# Patient Record
Sex: FEMALE | Race: OTHER | NOT HISPANIC OR LATINO | Employment: FULL TIME | URBAN - METROPOLITAN AREA
[De-identification: names, ages, dates, MRNs, and addresses within clinical notes are randomized per-mention and may not be internally consistent; named-entity substitution may affect disease eponyms.]

---

## 2021-03-21 ENCOUNTER — HOSPITAL ENCOUNTER (EMERGENCY)
Facility: HOSPITAL | Age: 41
Discharge: HOME | End: 2021-03-21
Attending: EMERGENCY MEDICINE
Payer: COMMERCIAL

## 2021-03-21 ENCOUNTER — APPOINTMENT (EMERGENCY)
Dept: CT IMAGING | Facility: HOSPITAL | Age: 41
End: 2021-03-21
Payer: COMMERCIAL

## 2021-03-21 VITALS
DIASTOLIC BLOOD PRESSURE: 68 MMHG | RESPIRATION RATE: 18 BRPM | HEIGHT: 64 IN | HEART RATE: 77 BPM | TEMPERATURE: 98.3 F | BODY MASS INDEX: 25.27 KG/M2 | WEIGHT: 148 LBS | OXYGEN SATURATION: 98 % | SYSTOLIC BLOOD PRESSURE: 121 MMHG

## 2021-03-21 DIAGNOSIS — S20.219A CHEST WALL CONTUSION: Primary | ICD-10-CM

## 2021-03-21 DIAGNOSIS — S20.219A RIB CONTUSION: ICD-10-CM

## 2021-03-21 LAB
ANION GAP SERPL CALCULATED.3IONS-SCNC: 6 MMOL/L (ref 4–13)
BASOPHILS # BLD AUTO: 0.07 THOUSANDS/ΜL (ref 0–0.1)
BASOPHILS NFR BLD AUTO: 1 % (ref 0–1)
BUN SERPL-MCNC: 17 MG/DL (ref 5–25)
CALCIUM SERPL-MCNC: 8.5 MG/DL (ref 8.3–10.1)
CHLORIDE SERPL-SCNC: 105 MMOL/L (ref 100–108)
CO2 SERPL-SCNC: 27 MMOL/L (ref 21–32)
CREAT SERPL-MCNC: 0.82 MG/DL (ref 0.6–1.3)
EOSINOPHIL # BLD AUTO: 0.18 THOUSAND/ΜL (ref 0–0.61)
EOSINOPHIL NFR BLD AUTO: 2 % (ref 0–6)
ERYTHROCYTE [DISTWIDTH] IN BLOOD BY AUTOMATED COUNT: 13 % (ref 11.6–15.1)
GFR SERPL CREATININE-BSD FRML MDRD: 91 ML/MIN/1.73SQ M
GLUCOSE SERPL-MCNC: 96 MG/DL (ref 65–140)
HCG SERPL QL: NEGATIVE
HCT VFR BLD AUTO: 40.4 % (ref 34.8–46.1)
HGB BLD-MCNC: 13 G/DL (ref 11.5–15.4)
IMM GRANULOCYTES # BLD AUTO: 0.04 THOUSAND/UL (ref 0–0.2)
IMM GRANULOCYTES NFR BLD AUTO: 0 % (ref 0–2)
LYMPHOCYTES # BLD AUTO: 2.11 THOUSANDS/ΜL (ref 0.6–4.47)
LYMPHOCYTES NFR BLD AUTO: 20 % (ref 14–44)
MCH RBC QN AUTO: 30 PG (ref 26.8–34.3)
MCHC RBC AUTO-ENTMCNC: 32.2 G/DL (ref 31.4–37.4)
MCV RBC AUTO: 93 FL (ref 82–98)
MONOCYTES # BLD AUTO: 0.69 THOUSAND/ΜL (ref 0.17–1.22)
MONOCYTES NFR BLD AUTO: 7 % (ref 4–12)
NEUTROPHILS # BLD AUTO: 7.36 THOUSANDS/ΜL (ref 1.85–7.62)
NEUTS SEG NFR BLD AUTO: 70 % (ref 43–75)
NRBC BLD AUTO-RTO: 0 /100 WBCS
PLATELET # BLD AUTO: 315 THOUSANDS/UL (ref 149–390)
PMV BLD AUTO: 9.3 FL (ref 8.9–12.7)
POTASSIUM SERPL-SCNC: 3.8 MMOL/L (ref 3.5–5.3)
RBC # BLD AUTO: 4.33 MILLION/UL (ref 3.81–5.12)
SODIUM SERPL-SCNC: 138 MMOL/L (ref 136–145)
WBC # BLD AUTO: 10.45 THOUSAND/UL (ref 4.31–10.16)

## 2021-03-21 PROCEDURE — 99284 EMERGENCY DEPT VISIT MOD MDM: CPT

## 2021-03-21 PROCEDURE — 36415 COLL VENOUS BLD VENIPUNCTURE: CPT | Performed by: EMERGENCY MEDICINE

## 2021-03-21 PROCEDURE — 99284 EMERGENCY DEPT VISIT MOD MDM: CPT | Performed by: EMERGENCY MEDICINE

## 2021-03-21 PROCEDURE — 80048 BASIC METABOLIC PNL TOTAL CA: CPT | Performed by: EMERGENCY MEDICINE

## 2021-03-21 PROCEDURE — 71260 CT THORAX DX C+: CPT

## 2021-03-21 PROCEDURE — 74177 CT ABD & PELVIS W/CONTRAST: CPT

## 2021-03-21 PROCEDURE — 84703 CHORIONIC GONADOTROPIN ASSAY: CPT | Performed by: EMERGENCY MEDICINE

## 2021-03-21 PROCEDURE — 85025 COMPLETE CBC W/AUTO DIFF WBC: CPT | Performed by: EMERGENCY MEDICINE

## 2021-03-21 PROCEDURE — G1004 CDSM NDSC: HCPCS

## 2021-03-21 RX ADMIN — IOHEXOL 100 ML: 350 INJECTION, SOLUTION INTRAVENOUS at 16:23

## 2021-03-21 NOTE — ED NOTES
Urine specimen collected POCT urine HCG completed was negative   Patient to CT scan     Sharon Contreras, CARLA  03/21/21 0048

## 2021-03-21 NOTE — ED PROVIDER NOTES
History  Chief Complaint   Patient presents with    Chest Injury     Pt reports she skiing and got caught on the lift  It caught the right side of her chest and now she has right rib and breast pain  HPI patient is a 45-year-old female, she reports she was skiing today apparently on a ski lift and as the lift hit the top of the hill, she was caught under the bar on the right side of her chest, the bar squeezed and crushed her right chest   She complains of pain in her ribs just around her right breast   She reports initially she could not breathe but slowly was be able to regain her breathing  She complains of pain on her right ribs and right upper abdomen  She reports pain with deep inspiration or with cough  She reports pain with every bump on the car ride here  She denies any acute shortness of breath  There is no loss of consciousness  She denies any head or neck pain she was helmeted  Denies difficulty using her extremities  Past medical history previously healthy-other than thyroid disease  Family history noncontributory  Social history-visiting the area, nonsmoker    None       History reviewed  No pertinent past medical history  History reviewed  No pertinent surgical history  History reviewed  No pertinent family history  I have reviewed and agree with the history as documented  E-Cigarette/Vaping    E-Cigarette Use Never User      E-Cigarette/Vaping Substances     Social History     Tobacco Use    Smoking status: Never Smoker    Smokeless tobacco: Never Used   Substance Use Topics    Alcohol use: Never     Frequency: Never    Drug use: Never       Review of Systems   Constitutional: Negative for diaphoresis, fatigue and fever  HENT: Negative for congestion, ear pain, nosebleeds and sore throat  Eyes: Negative for photophobia, pain, discharge and visual disturbance  Respiratory: Negative for cough, choking, chest tightness, shortness of breath and wheezing  Cardiovascular: Negative for chest pain and palpitations  Gastrointestinal: Positive for abdominal pain  Negative for abdominal distention, diarrhea and vomiting  Genitourinary: Negative for dysuria, flank pain and frequency  Musculoskeletal: Negative for back pain, gait problem and joint swelling  Skin: Negative for color change and rash  Neurological: Negative for dizziness, syncope and headaches  Psychiatric/Behavioral: Negative for behavioral problems and confusion  The patient is not nervous/anxious  All other systems reviewed and are negative  Chest injury abdominal injury    Physical Exam  Physical Exam  Vitals signs and nursing note reviewed  Constitutional:       Appearance: She is well-developed  HENT:      Head: Normocephalic  Right Ear: External ear normal       Left Ear: External ear normal       Nose: Nose normal    Eyes:      General: Lids are normal       Pupils: Pupils are equal, round, and reactive to light  Neck:      Musculoskeletal: Normal range of motion and neck supple  Cardiovascular:      Rate and Rhythm: Normal rate and regular rhythm  Pulses: Normal pulses  Heart sounds: Normal heart sounds  Pulmonary:      Effort: Pulmonary effort is normal  No respiratory distress  Breath sounds: Normal breath sounds  Comments: There is tenderness of the right anterior ribs, there is tenderness around the base of the right breast   No obvious hematoma there is an abrasion of the skin there, there is tenderness of the right upper abdomen  Chest:      Chest wall: Tenderness present  Abdominal:      General: Abdomen is flat  Bowel sounds are normal       Tenderness: There is abdominal tenderness  Comments: Right upper abdominal tenderness without rebound or guarding   Musculoskeletal: Normal range of motion  General: No deformity  Skin:     General: Skin is warm and dry        Comments: Right sided chest abrasion just below the right breast   Neurological:      General: No focal deficit present  Mental Status: She is alert and oriented to person, place, and time         Pulse oximetry normal at 97% adequate oxygenation, there is no hypoxia    Vital Signs  ED Triage Vitals [03/21/21 1550]   Temperature Pulse Respirations Blood Pressure SpO2   98 3 °F (36 8 °C) 85 18 130/81 97 %      Temp Source Heart Rate Source Patient Position - Orthostatic VS BP Location FiO2 (%)   Oral Monitor Sitting Left arm --      Pain Score       --           Vitals:    03/21/21 1550   BP: 130/81   Pulse: 85   Patient Position - Orthostatic VS: Sitting     Pulse oximetry is 97% on room air adequate oxygenation, there is no hypoxia    Visual Acuity  Visual Acuity      Most Recent Value   L Pupil Size (mm)  4   R Pupil Size (mm)  4          ED Medications  Medications   iohexol (OMNIPAQUE) 350 MG/ML injection (MULTI-DOSE) 100 mL (100 mL Intravenous Given 3/21/21 1623)       Diagnostic Studies  Results Reviewed     Procedure Component Value Units Date/Time    Basic metabolic panel [[id number]] Collected: 03/21/21 1609    Lab Status: Final result Specimen: Blood from Arm, Left Updated: 03/21/21 1635     Sodium 138 mmol/L      Potassium 3 8 mmol/L      Chloride 105 mmol/L      CO2 27 mmol/L      ANION GAP 6 mmol/L      BUN 17 mg/dL      Creatinine 0 82 mg/dL      Glucose 96 mg/dL      Calcium 8 5 mg/dL      eGFR 91 ml/min/1 73sq m     Narrative:      [place] guidelines for Chronic Kidney Disease (CKD):     Stage 1 with normal or high GFR (GFR > 90 mL/min/1 73 square meters)    Stage 2 Mild CKD (GFR = 60-89 mL/min/1 73 square meters)    Stage 3A Moderate CKD (GFR = 45-59 mL/min/1 73 square meters)    Stage 3B Moderate CKD (GFR = 30-44 mL/min/1 73 square meters)    Stage 4 Severe CKD (GFR = 15-29 mL/min/1 73 square meters)    Stage 5 End Stage CKD (GFR <15 mL/min/1 73 square meters)  Note: GFR calculation is accurate only with a steady state creatinine    hCG, qualitative pregnancy [[id number]]  (Normal) Collected: 03/21/21 1609    Lab Status: Final result Specimen: Blood from Arm, Left Updated: 03/21/21 1635     Preg, Serum Negative    CBC and differential [[id number]]  (Abnormal) Collected: 03/21/21 1609    Lab Status: Final result Specimen: Blood from Arm, Left Updated: 03/21/21 1619     WBC 10 45 Thousand/uL      RBC 4 33 Million/uL      Hemoglobin 13 0 g/dL      Hematocrit 40 4 %      MCV 93 fL      MCH 30 0 pg      MCHC 32 2 g/dL      RDW 13 0 %      MPV 9 3 fL      Platelets 872 Thousands/uL      nRBC 0 /100 WBCs      Neutrophils Relative 70 %      Immat GRANS % 0 %      Lymphocytes Relative 20 %      Monocytes Relative 7 %      Eosinophils Relative 2 %      Basophils Relative 1 %      Neutrophils Absolute 7 36 Thousands/µL      Immature Grans Absolute 0 04 Thousand/uL      Lymphocytes Absolute 2 11 Thousands/µL      Monocytes Absolute 0 69 Thousand/µL      Eosinophils Absolute 0 18 Thousand/µL      Basophils Absolute 0 07 Thousands/µL                  CT chest abdomen pelvis w contrast   Final Result by Dellis Scheuermann, MD (03/21 1719)      1  No acute traumatic injury to the chest, abdomen, or pelvis  2   4 mm right lower lobe lung nodule  Based on current Fleischner Society 2017 Guidelines on incidental pulmonary nodule, no routine follow-up is needed if the patient is considered low risk for lung cancer  If the patient is considered high risk for    lung cancer, 12 month follow-up non-contrast chest CT is recommended  The study was marked in EPIC for significant notification  Workstation performed: [id number]                    Procedures  Procedures         ED Course        pregnancy test was negative   Electrolytes were within normal limits normal BUN creatinine and  White count was minimally elevated 10 4 nonspecific finding, hemoglobin normal at 13 no sign of anemia      CT scan of chest abdomen pelvis showed no intra-abdominal or intrathoracic pathology no injury, patient did have a 4 mm nodule, reviewed with patient gave her copy of her report  [facility]  Medical decision making 79-year-old female status post a chair left injury apparently part of the chairlift caught her chest and squeeze her against the railing  Patient reports severe compression of right chest difficulty breathing now improved  CT was done to rule out in her thoracic and intra-abdominal pathology rib fracture there were negative  Discussed with patient consistent with rib contusion consistent with chest contusion  Patient did not want any narcotic analgesics  We discussed outpatient treatment follow-up we discussed indications to return  Disposition  Final diagnoses:   Chest wall contusion   Rib contusion     Time reflects when diagnosis was documented in both MDM as applicable and the Disposition within this note     Time User Action Codes Description Comment    3/21/2021  5:33 PM Mert Louis [[id number]] Chest wall contusion     3/21/2021  5:34 PM Abbie Eddy[address] [[id number]] Rib contusion       ED Disposition     ED Disposition Condition Date/Time Comment    Discharge Stable Sun Mar 21, 2021  5:33 PM [address] [place] [address]'[place] discharge to home/self care  Follow-up Information    None         There are no discharge medications for this patient  No discharge procedures on file      PDMP Review     None          ED Provider  Electronically Signed by           Eliecer Reyes MD  03/21/21 3262

## 2021-03-21 NOTE — DISCHARGE INSTRUCTIONS
Ice for the 1st 24 hours to the area  Hold your side for coughing and deep inspiration  Can move to heat after that   Tylenol orMotrin if needed for pain  Follow up with your doctor or return if worse-increasing difficulty breathing or any problems